# Patient Record
Sex: MALE | Race: BLACK OR AFRICAN AMERICAN | NOT HISPANIC OR LATINO | URBAN - METROPOLITAN AREA
[De-identification: names, ages, dates, MRNs, and addresses within clinical notes are randomized per-mention and may not be internally consistent; named-entity substitution may affect disease eponyms.]

---

## 2024-11-18 ENCOUNTER — EMERGENCY (EMERGENCY)
Facility: HOSPITAL | Age: 64
LOS: 0 days | Discharge: HOME | End: 2024-11-18
Attending: STUDENT IN AN ORGANIZED HEALTH CARE EDUCATION/TRAINING PROGRAM
Payer: COMMERCIAL

## 2024-11-18 VITALS
RESPIRATION RATE: 17 BRPM | OXYGEN SATURATION: 99 % | HEART RATE: 70 BPM | DIASTOLIC BLOOD PRESSURE: 87 MMHG | SYSTOLIC BLOOD PRESSURE: 136 MMHG

## 2024-11-18 VITALS
TEMPERATURE: 98 F | SYSTOLIC BLOOD PRESSURE: 166 MMHG | DIASTOLIC BLOOD PRESSURE: 93 MMHG | OXYGEN SATURATION: 98 % | HEIGHT: 68 IN | RESPIRATION RATE: 16 BRPM | WEIGHT: 179.9 LBS | HEART RATE: 79 BPM

## 2024-11-18 DIAGNOSIS — M25.552 PAIN IN LEFT HIP: ICD-10-CM

## 2024-11-18 DIAGNOSIS — M79.661 PAIN IN RIGHT LOWER LEG: ICD-10-CM

## 2024-11-18 PROCEDURE — 99284 EMERGENCY DEPT VISIT MOD MDM: CPT

## 2024-11-18 PROCEDURE — 93971 EXTREMITY STUDY: CPT | Mod: 26,RT

## 2024-11-18 RX ORDER — IBUPROFEN 200 MG
600 TABLET ORAL ONCE
Refills: 0 | Status: COMPLETED | OUTPATIENT
Start: 2024-11-18 | End: 2024-11-18

## 2024-11-18 RX ADMIN — Medication 600 MILLIGRAM(S): at 16:55

## 2024-11-18 NOTE — ED PROVIDER NOTE - CLINICAL SUMMARY MEDICAL DECISION MAKING FREE TEXT BOX
64-year-old male with no past medical history presents today complaining of right calf pain for the last 1 week.  Patient describes muscle soreness which comes and goes, improves when he takes ibuprofen however still persistent.  Now pain radiates up into his left hip.  He denies chest pain, shortness of breath, palpitation, recent travel, hormonal medication use.  On exam patient is awake alert and oriented x 3 well-appearing in no respiratory distress.  Lung sounds are clear heart sounds regular rate and rhythm, his abdomen is soft, right calf tenderness however no leg edema differential diagnosis includes but not limited to muscle strain, DVT, Baker's cyst.  Sonogram ordered.  Will reassess and dispo

## 2024-11-18 NOTE — ED PROVIDER NOTE - PATIENT PORTAL LINK FT
You can access the FollowMyHealth Patient Portal offered by [facility] by registering at the following website: [email or web address]. By joining [facility]’s FollowMyHealth portal, you will also be able to view your health information using other applications (apps) compatible with our system.

## 2024-11-18 NOTE — ED ADULT TRIAGE NOTE - CHIEF COMPLAINT QUOTE
Pt AAO x 3 with steady gait c/o having lower back pain and right calf pain x 1 week not getting better pt denies injury or trauma, no swelling noted to calf, cool to touch. pt states he usually gets nervous in the hospital denies PMH.

## 2024-11-18 NOTE — ED PROVIDER NOTE - PROGRESS NOTE DETAILS
Signed out pending sono, patient with intermittent pain over last 2 weeks or so worse when standing without known injury or trauma.  No redness, wounds.  Pain not worse with exertion/ walking and no claudication type pain.  Also with some pain radiating down from hip.  Seems c/w msk/ muscular/ sciatic pain.  DVT negative.  Will dc wit orth/ pmd follow up

## 2024-11-18 NOTE — ED ADULT NURSE NOTE - OBJECTIVE STATEMENT
Pt presents to ED A&Ox3 c/o R calf pain radiating up to R hip x 2 weeks. Pt also reports pain is now in L hip. Pt denies, smoking, falls, trauma, SOB, dizziness or recent travel.

## 2024-11-18 NOTE — ED PROVIDER NOTE - CARE PROVIDER_API CALL
Amanda aMrquez  Orthopaedic Surgery  [address], [address]  [place] [address]  Phone: [phone]  Fax: [phone]  Follow Up Time:     Dean Ma  Orthopaedic Surgery  [address]  [place] [address]  Phone: [phone]  Fax: [phone]  Follow Up Time:
